# Patient Record
Sex: MALE
[De-identification: names, ages, dates, MRNs, and addresses within clinical notes are randomized per-mention and may not be internally consistent; named-entity substitution may affect disease eponyms.]

---

## 2021-12-09 ENCOUNTER — DASHBOARD ENCOUNTERS (OUTPATIENT)
Age: 56
End: 2021-12-09

## 2021-12-09 ENCOUNTER — OFFICE VISIT (OUTPATIENT)
Dept: URBAN - METROPOLITAN AREA CLINIC 37 | Facility: CLINIC | Age: 56
End: 2021-12-09
Payer: COMMERCIAL

## 2021-12-09 VITALS
DIASTOLIC BLOOD PRESSURE: 84 MMHG | BODY MASS INDEX: 24.92 KG/M2 | HEIGHT: 64 IN | SYSTOLIC BLOOD PRESSURE: 136 MMHG | WEIGHT: 146 LBS | HEART RATE: 73 BPM | OXYGEN SATURATION: 98 %

## 2021-12-09 DIAGNOSIS — E55.9 VITAMIN D DEFICIENCY, UNSPECIFIED: ICD-10-CM

## 2021-12-09 DIAGNOSIS — Z12.11 ENCOUNTER FOR SCREENING FOR MALIGNANT NEOPLASM OF COLON: ICD-10-CM

## 2021-12-09 DIAGNOSIS — I10 ESSENTIAL (PRIMARY) HYPERTENSION: ICD-10-CM

## 2021-12-09 DIAGNOSIS — E78.2 MIXED HYPERLIPIDEMIA: ICD-10-CM

## 2021-12-09 PROBLEM — 34713006: Status: ACTIVE | Noted: 2021-12-09

## 2021-12-09 PROBLEM — 267434003: Status: ACTIVE | Noted: 2021-12-09

## 2021-12-09 PROBLEM — 59621000: Status: ACTIVE | Noted: 2021-12-09

## 2021-12-09 PROCEDURE — 99243 OFF/OP CNSLTJ NEW/EST LOW 30: CPT | Performed by: INTERNAL MEDICINE

## 2021-12-09 PROCEDURE — 99203 OFFICE O/P NEW LOW 30 MIN: CPT | Performed by: INTERNAL MEDICINE

## 2021-12-09 RX ORDER — HYDROCHLOROTHIAZIDE 12.5 MG/1
1 CAPSULE IN THE MORNING TABLET ORAL ONCE A DAY
Status: ACTIVE | COMMUNITY

## 2021-12-09 RX ORDER — ERGOCALCIFEROL 1.25 MG/1
1 CAPSULE CAPSULE ORAL
Status: ACTIVE | COMMUNITY

## 2021-12-09 RX ORDER — SODIUM, POTASSIUM,MAG SULFATES 17.5-3.13G
177 ML SOLUTION, RECONSTITUTED, ORAL ORAL ONCE
Qty: 1 | Refills: 0 | OUTPATIENT
Start: 2021-12-09 | End: 2021-12-11

## 2021-12-09 RX ORDER — ATORVASTATIN CALCIUM 10 MG/1
1 TABLET TABLET, FILM COATED ORAL ONCE A DAY
Status: ACTIVE | COMMUNITY

## 2021-12-09 NOTE — EXAM-MIGRATED EXAMINATIONS
General Examination         GENERAL APPEARANCE: alert, in no acute distress, well developed, well nourished.          HEAD: normocephalic, atraumatic.          EYES: sclera anicteric bilaterally.          EARS: normal.          THROAT: clear.          NECK/THYROID: neck supple, full range of motion, no cervical lymphadenopathy, no thyroid nodules, no thyromegaly, trachea midline.          SKIN: no suspicious lesions, warm and dry, no spider angiomata, palmar erythema or icterus.          HEART: no murmurs, regular rate and rhythm, S1, S2 normal.          LUNGS: clear to auscultation bilaterally, good air movement, no wheezes, rales, rhonchi.          ABDOMEN: bowel sounds present, no masses palpable, no organomegaly , no rebound tenderness, soft, nontender, nondistended.          RECTAL: deferred by patient.          MUSCULOSKELETAL: normal posture, normal gait and station, no decreased range of motion.          EXTREMITIES: no clubbing, cyanosis, or edema.          NEUROLOGIC: cranial nerves 2-12 grossly intact.          PSYCH: cooperative with exam, mood/affect full range.

## 2021-12-09 NOTE — HPI-NEW PATIENT CONSULTATION
Colorectal cancer screening:         Patient is here for initial consultation for first time screening colonoscopy.        Patients denies rectal bleeding, change in bowel habits, constipation, diarrhea, or abdominal pain.        Current bowel movement patterns: one soft BM everyday       Family history of GI malignancy: denies        Denies dysphagia or odynophagia        Currently taking anticoagulants/NSAIDs: none

## 2021-12-17 ENCOUNTER — OFFICE VISIT (OUTPATIENT)
Dept: URBAN - METROPOLITAN AREA SURGERY CENTER 8 | Facility: SURGERY CENTER | Age: 56
End: 2021-12-17
Payer: COMMERCIAL

## 2021-12-17 DIAGNOSIS — K63.89 BACTERIAL OVERGROWTH SYNDROME: ICD-10-CM

## 2021-12-17 DIAGNOSIS — Z12.11 AVERAGE RISK FOR CRC. DUE TO PT'S CO-MORBID STATE WITH END STAGE DEMENTIA, HIGH RISK FOR ANESTHESIA (PER NEUROLOGY); INABILITY TO TAKE A BOWEL PREP....WOULD NOT ADVISE ANY COLORECTAL CANCER SCREENING INCLUDING STOOL TEST FOR FECAL BLOOD.: ICD-10-CM

## 2021-12-17 DIAGNOSIS — D12.5 ADENOMA OF SIGMOID COLON: ICD-10-CM

## 2021-12-17 PROCEDURE — G8907 PT DOC NO EVENTS ON DISCHARG: HCPCS | Performed by: INTERNAL MEDICINE

## 2021-12-17 PROCEDURE — 45380 COLONOSCOPY AND BIOPSY: CPT | Performed by: INTERNAL MEDICINE

## 2021-12-17 RX ORDER — ERGOCALCIFEROL 1.25 MG/1
1 CAPSULE CAPSULE ORAL
Status: ACTIVE | COMMUNITY

## 2021-12-17 RX ORDER — ATORVASTATIN CALCIUM 10 MG/1
1 TABLET TABLET, FILM COATED ORAL ONCE A DAY
Status: ACTIVE | COMMUNITY

## 2021-12-17 RX ORDER — HYDROCHLOROTHIAZIDE 12.5 MG/1
1 CAPSULE IN THE MORNING TABLET ORAL ONCE A DAY
Status: ACTIVE | COMMUNITY

## 2022-01-05 ENCOUNTER — OFFICE VISIT (OUTPATIENT)
Dept: URBAN - METROPOLITAN AREA CLINIC 37 | Facility: CLINIC | Age: 57
End: 2022-01-05

## 2022-01-12 ENCOUNTER — OFFICE VISIT (OUTPATIENT)
Dept: URBAN - METROPOLITAN AREA CLINIC 37 | Facility: CLINIC | Age: 57
End: 2022-01-12

## 2024-08-12 ENCOUNTER — OFFICE VISIT (OUTPATIENT)
Dept: URBAN - METROPOLITAN AREA CLINIC 35 | Facility: CLINIC | Age: 59
End: 2024-08-12

## 2024-08-19 ENCOUNTER — OFFICE VISIT (OUTPATIENT)
Dept: URBAN - METROPOLITAN AREA CLINIC 37 | Facility: CLINIC | Age: 59
End: 2024-08-19
Payer: COMMERCIAL

## 2024-08-19 ENCOUNTER — TELEPHONE ENCOUNTER (OUTPATIENT)
Dept: URBAN - METROPOLITAN AREA CLINIC 35 | Facility: CLINIC | Age: 59
End: 2024-08-19

## 2024-08-19 VITALS
HEIGHT: 64 IN | WEIGHT: 143 LBS | DIASTOLIC BLOOD PRESSURE: 82 MMHG | SYSTOLIC BLOOD PRESSURE: 132 MMHG | HEART RATE: 72 BPM | OXYGEN SATURATION: 98 % | BODY MASS INDEX: 24.41 KG/M2

## 2024-08-19 DIAGNOSIS — R01.1 CARDIAC MURMUR: ICD-10-CM

## 2024-08-19 DIAGNOSIS — D50.8 OTHER IRON DEFICIENCY ANEMIA: ICD-10-CM

## 2024-08-19 DIAGNOSIS — D12.6 ADENOMATOUS POLYP OF COLON, UNSPECIFIED PART OF COLON: ICD-10-CM

## 2024-08-19 PROBLEM — 88610006: Status: ACTIVE | Noted: 2024-08-19

## 2024-08-19 PROBLEM — 87522002: Status: ACTIVE | Noted: 2024-08-19

## 2024-08-19 PROBLEM — 428054006: Status: ACTIVE | Noted: 2024-08-19

## 2024-08-19 PROCEDURE — 99204 OFFICE O/P NEW MOD 45 MIN: CPT | Performed by: HOSPITALIST

## 2024-08-19 RX ORDER — HYDROCHLOROTHIAZIDE 12.5 MG/1
1 CAPSULE IN THE MORNING TABLET ORAL ONCE A DAY
Status: ACTIVE | COMMUNITY

## 2024-08-19 RX ORDER — LISINOPRIL AND HYDROCHLOROTHIAZIDE 12.5; 2 MG/1; MG/1
TABLET ORAL
Qty: 90 TABLET | Status: ACTIVE | COMMUNITY

## 2024-08-19 RX ORDER — PNEUMOCOCCAL 20-VALENT CONJUGATE VACCINE 2.2; 2.2; 2.2; 2.2; 2.2; 2.2; 2.2; 2.2; 2.2; 2.2; 2.2; 2.2; 2.2; 2.2; 2.2; 2.2; 4.4; 2.2; 2.2; 2.2 UG/.5ML; UG/.5ML; UG/.5ML; UG/.5ML; UG/.5ML; UG/.5ML; UG/.5ML; UG/.5ML; UG/.5ML; UG/.5ML; UG/.5ML; UG/.5ML; UG/.5ML; UG/.5ML; UG/.5ML; UG/.5ML; UG/.5ML; UG/.5ML; UG/.5ML; UG/.5ML
AS DIRECTED INJECTION, SUSPENSION INTRAMUSCULAR
Status: ACTIVE | COMMUNITY

## 2024-08-19 RX ORDER — LEVOCETIRIZINE DIHYDROCHLORIDE 5 MG/1
1 TABLET IN THE EVENING TABLET, FILM COATED ORAL ONCE A DAY
Status: ACTIVE | COMMUNITY

## 2024-08-19 RX ORDER — ATORVASTATIN CALCIUM 10 MG/1
1 TABLET TABLET, FILM COATED ORAL ONCE A DAY
Status: ACTIVE | COMMUNITY

## 2024-08-19 RX ORDER — IBUPROFEN 600 MG/1
TABLET, FILM COATED ORAL
Qty: 28 TABLET | Status: ACTIVE | COMMUNITY

## 2024-08-19 RX ORDER — NICOTINE POLACRILEX 4 MG/1
GUM, CHEWING BUCCAL
Qty: 110 EACH | Status: ACTIVE | COMMUNITY

## 2024-08-19 RX ORDER — FERROUS SULFATE TAB EC 325 MG (65 MG FE EQUIVALENT) 325 (65 FE) MG
TABLET DELAYED RESPONSE ORAL
Qty: 60 TABLET | Status: ACTIVE | COMMUNITY

## 2024-08-19 RX ORDER — AMOXICILLIN 500 MG/1
CAPSULE ORAL
Qty: 21 CAPSULE | Status: ACTIVE | COMMUNITY

## 2024-08-19 RX ORDER — BENZONATATE 200 MG/1
1 CAPSULE AS NEEDED CAPSULE ORAL THREE TIMES A DAY
Status: ACTIVE | COMMUNITY

## 2024-08-19 RX ORDER — NIRMATRELVIR AND RITONAVIR 150-100 MG
AS DIRECTED KIT ORAL
Status: ACTIVE | COMMUNITY

## 2024-08-19 RX ORDER — ERGOCALCIFEROL 1.25 MG/1
1 CAPSULE CAPSULE ORAL
Status: ACTIVE | COMMUNITY

## 2024-08-19 NOTE — HPI-TODAY'S VISIT:
59-year-old male with past med history of hypertension, hyperlipidemia, diabetes, ascending aorta dilatation, cardiac murmur presents to the gastroenterology clinic due to new onset iron deficiency anemia.   I reviewed his recent labs which showed severe iron deficiency anemia with saturation of 5% and low iron level with elevated TIBC and ferritin level.  Hemoglobin of 13.1 with normocytic anemia.  Had colonoscopy in December 2021 which showed 3-4 small sessile polyps in the descending colon and sigmoid colon and biopsies consistent with 1 precancerous tubular adenoma in the sigmoid colon and breast hyperplastic polyps.  He also had hemorrhoids.  Bowel prep was suboptimal and was recommended to repeat colonoscopy in 5 years.  Denies any family history of colon cancer or stomach cancer.   Patient denies any upper or lower GI symptoms at this time.  Denies any overt evidence of GI bleed.  Denies any NSAID intake.  Patient has been recently evaluated by his cardiologist due to aortic artery dilatation as well as new onset cardiac murmur as well as CT coronaries showing coronary atherosclerosis.  He has has [facility] Echo scheduled for 09/26/2024 for further evaluation.  Patient never had upper EGD in the past.

## 2024-08-26 LAB
(TTG) AB, IGA: <1
(TTG) AB, IGG: <1
ANTIGLIADIN ABS, IGA: 7.4
GLIADIN (DEAMIDATED) AB (IGG): <1
H PYLORI BREATH TEST: DETECTED
IMMUNOGLOBULIN A: 234

## 2024-08-27 ENCOUNTER — TELEPHONE ENCOUNTER (OUTPATIENT)
Dept: URBAN - METROPOLITAN AREA CLINIC 35 | Facility: CLINIC | Age: 59
End: 2024-08-27

## 2024-08-27 RX ORDER — METRONIDAZOLE 500 MG/1
1 TABLET TABLET ORAL THREE TIMES A DAY
Qty: 42 TABLET | Refills: 0 | OUTPATIENT
Start: 2024-08-27 | End: 2024-09-10

## 2024-08-27 RX ORDER — DOXYCYCLINE HYCLATE 100 MG/1
1 TABLET CAPSULE, GELATIN COATED ORAL TWICE DAILY
Qty: 28 | Refills: 0 | OUTPATIENT
Start: 2024-08-27 | End: 2024-09-10

## 2024-08-27 RX ORDER — BISMUTH SUBSALICYLATE 262 MG/1
1 TABLET TABLET, CHEWABLE ORAL
Qty: 56 TABLET | Refills: 0 | OUTPATIENT
Start: 2024-08-27 | End: 2024-09-10

## 2024-08-27 RX ORDER — OMEPRAZOLE 20 MG/1
1 CAPSULE 30 MINUTES BEFORE MORNING MEAL CAPSULE, DELAYED RELEASE ORAL TWICE DAILY
Qty: 28 | Refills: 0 | OUTPATIENT
Start: 2024-08-27

## 2024-09-11 LAB — H PYLORI BREATH TEST: (no result)

## 2024-09-12 ENCOUNTER — TELEPHONE ENCOUNTER (OUTPATIENT)
Dept: URBAN - METROPOLITAN AREA CLINIC 35 | Facility: CLINIC | Age: 59
End: 2024-09-12

## 2024-09-30 ENCOUNTER — LAB OUTSIDE AN ENCOUNTER (OUTPATIENT)
Dept: URBAN - METROPOLITAN AREA CLINIC 35 | Facility: CLINIC | Age: 59
End: 2024-09-30

## 2024-10-01 ENCOUNTER — LAB OUTSIDE AN ENCOUNTER (OUTPATIENT)
Dept: URBAN - METROPOLITAN AREA CLINIC 35 | Facility: CLINIC | Age: 59
End: 2024-10-01

## 2024-10-02 LAB — H PYLORI BREATH TEST: NOT DETECTED

## 2024-10-03 ENCOUNTER — TELEPHONE ENCOUNTER (OUTPATIENT)
Dept: URBAN - METROPOLITAN AREA CLINIC 35 | Facility: CLINIC | Age: 59
End: 2024-10-03

## 2024-10-14 ENCOUNTER — OFFICE VISIT (OUTPATIENT)
Dept: URBAN - METROPOLITAN AREA CLINIC 35 | Facility: CLINIC | Age: 59
End: 2024-10-14
Payer: COMMERCIAL

## 2024-10-14 VITALS
OXYGEN SATURATION: 97 % | SYSTOLIC BLOOD PRESSURE: 124 MMHG | HEIGHT: 64 IN | BODY MASS INDEX: 23.9 KG/M2 | HEART RATE: 71 BPM | DIASTOLIC BLOOD PRESSURE: 72 MMHG | WEIGHT: 140 LBS

## 2024-10-14 DIAGNOSIS — A04.8 H. PYLORI INFECTION: ICD-10-CM

## 2024-10-14 DIAGNOSIS — R01.1 CARDIAC MURMUR: ICD-10-CM

## 2024-10-14 DIAGNOSIS — D12.5 ADENOMA OF SIGMOID COLON: ICD-10-CM

## 2024-10-14 DIAGNOSIS — D50.9 IRON DEFICIENCY ANEMIA, UNSPECIFIED IRON DEFICIENCY ANEMIA TYPE: ICD-10-CM

## 2024-10-14 PROBLEM — 721730009: Status: ACTIVE | Noted: 2024-10-14

## 2024-10-14 PROCEDURE — 99214 OFFICE O/P EST MOD 30 MIN: CPT | Performed by: HOSPITALIST

## 2024-10-14 RX ORDER — AMOXICILLIN 500 MG/1
CAPSULE ORAL
Qty: 21 CAPSULE | Status: ACTIVE | COMMUNITY

## 2024-10-14 RX ORDER — LISINOPRIL AND HYDROCHLOROTHIAZIDE 12.5; 2 MG/1; MG/1
TABLET ORAL
Qty: 90 TABLET | Status: ACTIVE | COMMUNITY

## 2024-10-14 RX ORDER — IBUPROFEN 600 MG/1
TABLET, FILM COATED ORAL
Qty: 28 TABLET | Status: ACTIVE | COMMUNITY

## 2024-10-14 RX ORDER — PNEUMOCOCCAL 20-VALENT CONJUGATE VACCINE 2.2; 2.2; 2.2; 2.2; 2.2; 2.2; 2.2; 2.2; 2.2; 2.2; 2.2; 2.2; 2.2; 2.2; 2.2; 2.2; 4.4; 2.2; 2.2; 2.2 UG/.5ML; UG/.5ML; UG/.5ML; UG/.5ML; UG/.5ML; UG/.5ML; UG/.5ML; UG/.5ML; UG/.5ML; UG/.5ML; UG/.5ML; UG/.5ML; UG/.5ML; UG/.5ML; UG/.5ML; UG/.5ML; UG/.5ML; UG/.5ML; UG/.5ML; UG/.5ML
AS DIRECTED INJECTION, SUSPENSION INTRAMUSCULAR
Status: ACTIVE | COMMUNITY

## 2024-10-14 RX ORDER — BENZONATATE 200 MG/1
1 CAPSULE AS NEEDED CAPSULE ORAL THREE TIMES A DAY
Status: ACTIVE | COMMUNITY

## 2024-10-14 RX ORDER — OMEPRAZOLE 20 MG/1
1 CAPSULE 30 MINUTES BEFORE MORNING MEAL CAPSULE, DELAYED RELEASE ORAL TWICE DAILY
Qty: 28 | Refills: 0 | Status: ACTIVE | COMMUNITY
Start: 2024-08-27

## 2024-10-14 RX ORDER — LEVOCETIRIZINE DIHYDROCHLORIDE 5 MG/1
1 TABLET IN THE EVENING TABLET, FILM COATED ORAL ONCE A DAY
Status: ACTIVE | COMMUNITY

## 2024-10-14 RX ORDER — ERGOCALCIFEROL 1.25 MG/1
1 CAPSULE CAPSULE ORAL
Status: ACTIVE | COMMUNITY

## 2024-10-14 RX ORDER — ATORVASTATIN CALCIUM 10 MG/1
1 TABLET TABLET, FILM COATED ORAL ONCE A DAY
Status: ACTIVE | COMMUNITY

## 2024-10-14 RX ORDER — FERROUS SULFATE TAB EC 325 MG (65 MG FE EQUIVALENT) 325 (65 FE) MG
TABLET DELAYED RESPONSE ORAL
Qty: 60 TABLET | Status: ACTIVE | COMMUNITY

## 2024-10-14 RX ORDER — NIRMATRELVIR AND RITONAVIR 150-100 MG
AS DIRECTED KIT ORAL
Status: ACTIVE | COMMUNITY

## 2024-10-14 RX ORDER — HYDROCHLOROTHIAZIDE 12.5 MG/1
1 CAPSULE IN THE MORNING TABLET ORAL ONCE A DAY
Status: ACTIVE | COMMUNITY

## 2024-10-14 RX ORDER — NICOTINE POLACRILEX 4 MG/1
GUM, CHEWING BUCCAL
Qty: 110 EACH | Status: ACTIVE | COMMUNITY

## 2024-10-14 NOTE — HPI-TODAY'S VISIT:
59-year-old male with past med history of hypertension, hyperlipidemia, diabetes, ascending aorta dilatation, cardiac murmur presents to the gastroenterology clinic due to new onset iron deficiency anemia.  Interval history -Patient was tested for celiac disease and was negative.  Fit test was negative.  H. pylori test was positive and was treated with quadruple therapy and repeat H. pylori testing was negative consistent with eradication. -Patient still undergoing cardiology workup and echocardiogram pending at this time which is planned on November 15. -Patient denies any GI symptoms for today.  Has been taking iron tablets daily.   Last clinic visit I reviewed his recent labs which showed severe iron deficiency anemia with saturation of 5% and low iron level with elevated TIBC and ferritin level.  Hemoglobin of 13.1 with normocytic anemia.  Had colonoscopy in December 2021 which showed 3-4 small sessile polyps in the descending colon and sigmoid colon and biopsies consistent with 1 precancerous tubular adenoma in the sigmoid colon and breast hyperplastic polyps. He also had hemorrhoids.  Bowel prep was suboptimal and was recommended to repeat colonoscopy in 5 years.  Denies any family history of colon cancer or stomach cancer.

## 2025-01-03 ENCOUNTER — TELEPHONE ENCOUNTER (OUTPATIENT)
Dept: URBAN - METROPOLITAN AREA CLINIC 35 | Facility: CLINIC | Age: 60
End: 2025-01-03

## 2025-01-24 ENCOUNTER — OFFICE VISIT (OUTPATIENT)
Dept: URBAN - METROPOLITAN AREA CLINIC 35 | Facility: CLINIC | Age: 60
End: 2025-01-24
Payer: COMMERCIAL

## 2025-01-24 ENCOUNTER — LAB OUTSIDE AN ENCOUNTER (OUTPATIENT)
Dept: URBAN - METROPOLITAN AREA CLINIC 35 | Facility: CLINIC | Age: 60
End: 2025-01-24

## 2025-01-24 ENCOUNTER — TELEPHONE ENCOUNTER (OUTPATIENT)
Dept: URBAN - METROPOLITAN AREA CLINIC 35 | Facility: CLINIC | Age: 60
End: 2025-01-24

## 2025-01-24 ENCOUNTER — OFFICE VISIT (OUTPATIENT)
Dept: URBAN - METROPOLITAN AREA SURGERY CENTER 8 | Facility: SURGERY CENTER | Age: 60
End: 2025-01-24

## 2025-01-24 VITALS
SYSTOLIC BLOOD PRESSURE: 122 MMHG | HEART RATE: 71 BPM | DIASTOLIC BLOOD PRESSURE: 80 MMHG | HEIGHT: 64 IN | OXYGEN SATURATION: 99 % | BODY MASS INDEX: 24.04 KG/M2 | WEIGHT: 140.8 LBS

## 2025-01-24 DIAGNOSIS — R01.1 CARDIAC MURMUR: ICD-10-CM

## 2025-01-24 DIAGNOSIS — A04.8 H. PYLORI INFECTION: ICD-10-CM

## 2025-01-24 DIAGNOSIS — D12.6 ADENOMATOUS POLYP OF COLON, UNSPECIFIED PART OF COLON: ICD-10-CM

## 2025-01-24 DIAGNOSIS — D50.9 IRON DEFICIENCY ANEMIA, UNSPECIFIED IRON DEFICIENCY ANEMIA TYPE: ICD-10-CM

## 2025-01-24 PROCEDURE — 99214 OFFICE O/P EST MOD 30 MIN: CPT | Performed by: HOSPITALIST

## 2025-01-24 RX ORDER — FERROUS SULFATE TAB EC 325 MG (65 MG FE EQUIVALENT) 325 (65 FE) MG
TABLET DELAYED RESPONSE ORAL
Qty: 60 TABLET | Status: ACTIVE | COMMUNITY

## 2025-01-24 RX ORDER — IBUPROFEN 600 MG/1
TABLET, FILM COATED ORAL
Qty: 28 TABLET | Status: ACTIVE | COMMUNITY

## 2025-01-24 RX ORDER — OMEPRAZOLE 20 MG/1
1 CAPSULE 30 MINUTES BEFORE MORNING MEAL CAPSULE, DELAYED RELEASE ORAL TWICE DAILY
Qty: 28 | Refills: 0 | Status: ACTIVE | COMMUNITY
Start: 2024-08-27

## 2025-01-24 RX ORDER — AMOXICILLIN 500 MG/1
CAPSULE ORAL
Qty: 21 CAPSULE | Status: ACTIVE | COMMUNITY

## 2025-01-24 RX ORDER — PNEUMOCOCCAL 20-VALENT CONJUGATE VACCINE 2.2; 2.2; 2.2; 2.2; 2.2; 2.2; 2.2; 2.2; 2.2; 2.2; 2.2; 2.2; 2.2; 2.2; 2.2; 2.2; 4.4; 2.2; 2.2; 2.2 UG/.5ML; UG/.5ML; UG/.5ML; UG/.5ML; UG/.5ML; UG/.5ML; UG/.5ML; UG/.5ML; UG/.5ML; UG/.5ML; UG/.5ML; UG/.5ML; UG/.5ML; UG/.5ML; UG/.5ML; UG/.5ML; UG/.5ML; UG/.5ML; UG/.5ML; UG/.5ML
AS DIRECTED INJECTION, SUSPENSION INTRAMUSCULAR
Status: ACTIVE | COMMUNITY

## 2025-01-24 RX ORDER — NICOTINE POLACRILEX 4 MG/1
GUM, CHEWING BUCCAL
Qty: 110 EACH | Status: ACTIVE | COMMUNITY

## 2025-01-24 RX ORDER — ERGOCALCIFEROL 1.25 MG/1
1 CAPSULE CAPSULE ORAL
Status: ACTIVE | COMMUNITY

## 2025-01-24 RX ORDER — BENZONATATE 200 MG/1
1 CAPSULE AS NEEDED CAPSULE ORAL THREE TIMES A DAY
Status: ACTIVE | COMMUNITY

## 2025-01-24 RX ORDER — LISINOPRIL AND HYDROCHLOROTHIAZIDE 12.5; 2 MG/1; MG/1
TABLET ORAL
Qty: 90 TABLET | Status: ACTIVE | COMMUNITY

## 2025-01-24 RX ORDER — NIRMATRELVIR AND RITONAVIR 150-100 MG
AS DIRECTED KIT ORAL
Status: ACTIVE | COMMUNITY

## 2025-01-24 RX ORDER — ATORVASTATIN CALCIUM 10 MG/1
1 TABLET TABLET, FILM COATED ORAL ONCE A DAY
Status: ACTIVE | COMMUNITY

## 2025-01-24 RX ORDER — LEVOCETIRIZINE DIHYDROCHLORIDE 5 MG/1
1 TABLET IN THE EVENING TABLET, FILM COATED ORAL ONCE A DAY
Status: ACTIVE | COMMUNITY

## 2025-01-24 RX ORDER — HYDROCHLOROTHIAZIDE 12.5 MG/1
1 CAPSULE IN THE MORNING TABLET ORAL ONCE A DAY
Status: ACTIVE | COMMUNITY

## 2025-01-24 NOTE — HPI-TODAY'S VISIT:
Patient present today for a follow-up for anemia.  Interval history -Patient reports he has completed his workup with cardiologist and he was told his heart looks good and follow-up with a cardiologist in 6 months.  We do not have the records to review and we will obtain it from his cardiology office and also get clearance for his endoscopy procedure. -Patient has been taking ferrous sulfate twice daily for the past 3 to 4 months and we will repeat his iron studies. -Patient denies any overt evidence of GI bleed or any other GI symptoms at this time.  (Last Visit 10.14.2024) 59-year-old male with past med history of hypertension, hyperlipidemia, diabetes, ascending aorta dilatation, cardiac murmur presents to the gastroenterology clinic due to new onset iron deficiency anemia.  Interval history -Patient was tested for celiac disease and was negative.  Fit test was negative.  H. pylori test was positive and was treated with quadruple therapy and repeat H. pylori testing was negative consistent with eradication. -Patient still undergoing cardiology workup and echocardiogram pending at this time which is planned on November 15. -Patient denies any GI symptoms for today.  Has been taking iron tablets daily.   Last clinic visit I reviewed his recent labs which showed severe iron deficiency anemia with saturation of 5% and low iron level with elevated TIBC and ferritin level.  Hemoglobin of 13.1 with normocytic anemia.  Had colonoscopy in December 2021 which showed 3-4 small sessile polyps in the descending colon and sigmoid colon and biopsies consistent with 1 precancerous tubular adenoma in the sigmoid colon and breast hyperplastic polyps. He also had hemorrhoids.  Bowel prep was suboptimal and was recommended to repeat colonoscopy in 5 years.  Denies any family history of colon cancer or stomach cancer.

## 2025-02-03 ENCOUNTER — OFFICE VISIT (OUTPATIENT)
Dept: URBAN - METROPOLITAN AREA CLINIC 35 | Facility: CLINIC | Age: 60
End: 2025-02-03

## 2025-02-25 ENCOUNTER — TELEPHONE ENCOUNTER (OUTPATIENT)
Dept: URBAN - METROPOLITAN AREA CLINIC 35 | Facility: CLINIC | Age: 60
End: 2025-02-25

## 2025-02-26 ENCOUNTER — OFFICE VISIT (OUTPATIENT)
Dept: URBAN - METROPOLITAN AREA MEDICAL CENTER 10 | Facility: MEDICAL CENTER | Age: 60
End: 2025-02-26

## 2025-02-26 RX ORDER — ATORVASTATIN CALCIUM 10 MG/1
1 TABLET TABLET, FILM COATED ORAL ONCE A DAY
Status: ACTIVE | COMMUNITY

## 2025-02-26 RX ORDER — ERGOCALCIFEROL 1.25 MG/1
1 CAPSULE CAPSULE ORAL
Status: ACTIVE | COMMUNITY

## 2025-02-26 RX ORDER — PNEUMOCOCCAL 20-VALENT CONJUGATE VACCINE 2.2; 2.2; 2.2; 2.2; 2.2; 2.2; 2.2; 2.2; 2.2; 2.2; 2.2; 2.2; 2.2; 2.2; 2.2; 2.2; 4.4; 2.2; 2.2; 2.2 UG/.5ML; UG/.5ML; UG/.5ML; UG/.5ML; UG/.5ML; UG/.5ML; UG/.5ML; UG/.5ML; UG/.5ML; UG/.5ML; UG/.5ML; UG/.5ML; UG/.5ML; UG/.5ML; UG/.5ML; UG/.5ML; UG/.5ML; UG/.5ML; UG/.5ML; UG/.5ML
AS DIRECTED INJECTION, SUSPENSION INTRAMUSCULAR
Status: ACTIVE | COMMUNITY

## 2025-02-26 RX ORDER — IBUPROFEN 600 MG/1
TABLET, FILM COATED ORAL
Qty: 28 TABLET | Status: ACTIVE | COMMUNITY

## 2025-02-26 RX ORDER — NIRMATRELVIR AND RITONAVIR 150-100 MG
AS DIRECTED KIT ORAL
Status: ACTIVE | COMMUNITY

## 2025-02-26 RX ORDER — FERROUS SULFATE TAB EC 325 MG (65 MG FE EQUIVALENT) 325 (65 FE) MG
TABLET DELAYED RESPONSE ORAL
Qty: 60 TABLET | Status: ACTIVE | COMMUNITY

## 2025-02-26 RX ORDER — OMEPRAZOLE 20 MG/1
1 CAPSULE 30 MINUTES BEFORE MORNING MEAL CAPSULE, DELAYED RELEASE ORAL TWICE DAILY
Qty: 28 | Refills: 0 | Status: ACTIVE | COMMUNITY
Start: 2024-08-27

## 2025-02-26 RX ORDER — HYDROCHLOROTHIAZIDE 12.5 MG/1
1 CAPSULE IN THE MORNING TABLET ORAL ONCE A DAY
Status: ACTIVE | COMMUNITY

## 2025-02-26 RX ORDER — LISINOPRIL AND HYDROCHLOROTHIAZIDE 12.5; 2 MG/1; MG/1
TABLET ORAL
Qty: 90 TABLET | Status: ACTIVE | COMMUNITY

## 2025-02-26 RX ORDER — NICOTINE POLACRILEX 4 MG/1
GUM, CHEWING BUCCAL
Qty: 110 EACH | Status: ACTIVE | COMMUNITY

## 2025-02-26 RX ORDER — OMEPRAZOLE 40 MG/1
1 CAPSULE 30 MINUTES BEFORE MORNING MEAL CAPSULE, DELAYED RELEASE ORAL ONCE A DAY
Qty: 60 | Refills: 1 | OUTPATIENT
Start: 2025-02-26

## 2025-02-26 RX ORDER — LEVOCETIRIZINE DIHYDROCHLORIDE 5 MG/1
1 TABLET IN THE EVENING TABLET, FILM COATED ORAL ONCE A DAY
Status: ACTIVE | COMMUNITY

## 2025-02-26 RX ORDER — BENZONATATE 200 MG/1
1 CAPSULE AS NEEDED CAPSULE ORAL THREE TIMES A DAY
Status: ACTIVE | COMMUNITY

## 2025-02-26 RX ORDER — AMOXICILLIN 500 MG/1
CAPSULE ORAL
Qty: 21 CAPSULE | Status: ACTIVE | COMMUNITY

## 2025-03-12 ENCOUNTER — TELEPHONE ENCOUNTER (OUTPATIENT)
Dept: URBAN - METROPOLITAN AREA CLINIC 35 | Facility: CLINIC | Age: 60
End: 2025-03-12

## 2025-03-17 ENCOUNTER — OFFICE VISIT (OUTPATIENT)
Dept: URBAN - METROPOLITAN AREA CLINIC 37 | Facility: CLINIC | Age: 60
End: 2025-03-17
Payer: COMMERCIAL

## 2025-03-17 ENCOUNTER — LAB OUTSIDE AN ENCOUNTER (OUTPATIENT)
Dept: URBAN - METROPOLITAN AREA CLINIC 37 | Facility: CLINIC | Age: 60
End: 2025-03-17

## 2025-03-17 VITALS
OXYGEN SATURATION: 98 % | SYSTOLIC BLOOD PRESSURE: 120 MMHG | BODY MASS INDEX: 23.97 KG/M2 | WEIGHT: 140.4 LBS | HEART RATE: 62 BPM | HEIGHT: 64 IN | DIASTOLIC BLOOD PRESSURE: 70 MMHG

## 2025-03-17 DIAGNOSIS — A04.8 H. PYLORI INFECTION: ICD-10-CM

## 2025-03-17 DIAGNOSIS — D50.9 IRON DEFICIENCY ANEMIA, UNSPECIFIED IRON DEFICIENCY ANEMIA TYPE: ICD-10-CM

## 2025-03-17 DIAGNOSIS — D12.5 ADENOMATOUS POLYP OF SIGMOID COLON: ICD-10-CM

## 2025-03-17 DIAGNOSIS — R01.1 CARDIAC MURMUR: ICD-10-CM

## 2025-03-17 PROCEDURE — 99213 OFFICE O/P EST LOW 20 MIN: CPT | Performed by: HOSPITALIST

## 2025-03-17 RX ORDER — IBUPROFEN 600 MG/1
TABLET, FILM COATED ORAL
Qty: 28 TABLET | Status: ACTIVE | COMMUNITY

## 2025-03-17 RX ORDER — NIRMATRELVIR AND RITONAVIR 150-100 MG
AS DIRECTED KIT ORAL
Status: ACTIVE | COMMUNITY

## 2025-03-17 RX ORDER — AMOXICILLIN 500 MG/1
CAPSULE ORAL
Qty: 21 CAPSULE | Status: ACTIVE | COMMUNITY

## 2025-03-17 RX ORDER — NICOTINE POLACRILEX 4 MG/1
GUM, CHEWING BUCCAL
Qty: 110 EACH | Status: ACTIVE | COMMUNITY

## 2025-03-17 RX ORDER — BENZONATATE 200 MG/1
1 CAPSULE AS NEEDED CAPSULE ORAL THREE TIMES A DAY
Status: ACTIVE | COMMUNITY

## 2025-03-17 RX ORDER — OMEPRAZOLE 20 MG/1
1 CAPSULE 30 MINUTES BEFORE MORNING MEAL CAPSULE, DELAYED RELEASE ORAL TWICE DAILY
Qty: 28 | Refills: 0 | Status: ACTIVE | COMMUNITY
Start: 2024-08-27

## 2025-03-17 RX ORDER — ERGOCALCIFEROL 1.25 MG/1
1 CAPSULE CAPSULE ORAL
Status: ACTIVE | COMMUNITY

## 2025-03-17 RX ORDER — OMEPRAZOLE 40 MG/1
1 CAPSULE 30 MINUTES BEFORE MORNING MEAL CAPSULE, DELAYED RELEASE ORAL ONCE A DAY
Qty: 60 | Refills: 1 | Status: ACTIVE | COMMUNITY
Start: 2025-02-26

## 2025-03-17 RX ORDER — LISINOPRIL AND HYDROCHLOROTHIAZIDE 12.5; 2 MG/1; MG/1
TABLET ORAL
Qty: 90 TABLET | Status: ACTIVE | COMMUNITY

## 2025-03-17 RX ORDER — ATORVASTATIN CALCIUM 10 MG/1
1 TABLET TABLET, FILM COATED ORAL ONCE A DAY
Status: ACTIVE | COMMUNITY

## 2025-03-17 RX ORDER — HYDROCHLOROTHIAZIDE 12.5 MG/1
1 CAPSULE IN THE MORNING TABLET ORAL ONCE A DAY
Status: ACTIVE | COMMUNITY

## 2025-03-17 RX ORDER — FERROUS SULFATE TAB EC 325 MG (65 MG FE EQUIVALENT) 325 (65 FE) MG
TABLET DELAYED RESPONSE ORAL
Qty: 60 TABLET | Status: ACTIVE | COMMUNITY

## 2025-03-17 RX ORDER — LEVOCETIRIZINE DIHYDROCHLORIDE 5 MG/1
1 TABLET IN THE EVENING TABLET, FILM COATED ORAL ONCE A DAY
Status: ACTIVE | COMMUNITY

## 2025-03-17 RX ORDER — PNEUMOCOCCAL 20-VALENT CONJUGATE VACCINE 2.2; 2.2; 2.2; 2.2; 2.2; 2.2; 2.2; 2.2; 2.2; 2.2; 2.2; 2.2; 2.2; 2.2; 2.2; 2.2; 4.4; 2.2; 2.2; 2.2 UG/.5ML; UG/.5ML; UG/.5ML; UG/.5ML; UG/.5ML; UG/.5ML; UG/.5ML; UG/.5ML; UG/.5ML; UG/.5ML; UG/.5ML; UG/.5ML; UG/.5ML; UG/.5ML; UG/.5ML; UG/.5ML; UG/.5ML; UG/.5ML; UG/.5ML; UG/.5ML
AS DIRECTED INJECTION, SUSPENSION INTRAMUSCULAR
Status: ACTIVE | COMMUNITY

## 2025-03-17 NOTE — HPI-TODAY'S VISIT:
Patient present today for a follow-up after his procedure and anemia  Interval history -Patient has done well following endoscopy without any complications. -Biopsies for any acute findings. -Patient has been doing okay from GI standpoint and has been following with his cardiologist and family doctor.  Denies any GI symptoms at this time.  Colonoscopy (02.26.2025) Impression: Preparation of the colon was inadequate. A few ulcers in the terminal ileum. Biopsied. Revealed Small intestinal mucosa with no significant histopathologic change.No significant active inflammation or ulceration. One 3 mm polyp in the transverse colon, removed with                        a jumbo cold forceps. Resected and retrieved. Showed Regenerative colonic mucosa with lymphoid aggregate. The entire examined colon is normal. Biopsied. Revealed Colonic mucosa with no significant histopathologic change. The examination was otherwise normal on direct and                        retroflexion views.Inadequate bowel prep noted especially in the right                        side of the colon limiting visualization of the                        chronic mucosa and polyps could be missed. Not                        adequate for screening purposes.  EGD (02.26.2025) Impression: Esophagogastric landmarks identified. Normal esophagus. Erosive gastropathy with no bleeding and no stigmata                        of recent bleeding. Biopsied.Revealed Antral mucosa with mild chronic inactive inflammation and features suggestive of reactive (chemical) gastropathy. Oxyntic mucosa with mild chronic inactive inflammation.No Helicobacter-like organisms identified (Giemsa stain).Negative for intestinal metaplasia (PAS/AB stain). Erythematous duodenopathy. Biopsied. Showed Duodenal mucosa with regenerative features and foveolar metaplasia, suggestive of peptic injury.    (Last Visit 01.24.2025) Patient present today for a follow-up for anemia.  Interval history -Patient reports he has completed his workup with cardiologist and he was told his heart looks good and follow-up with a cardiologist in 6 months.  We do not have the records to review and we will obtain it from his cardiology office and also get clearance for his endoscopy procedure. -Patient has been taking ferrous sulfate twice daily for the past 3 to 4 months and we will repeat his iron studies. -Patient denies any overt evidence of GI bleed or any other GI symptoms at this time.  (Last Visit 10.14.2024) 59-year-old male with past med history of hypertension, hyperlipidemia, diabetes, ascending aorta dilatation, cardiac murmur presents to the gastroenterology clinic due to new onset iron deficiency anemia.    Interval history -Patient was tested for celiac disease and was negative.  Fit test was negative.  H. pylori test was positive and was treated with quadruple therapy and repeat H. pylori testing was negative consistent with eradication. -Patient still undergoing cardiology workup and echocardiogram pending at this time which is planned on November 15. -Patient denies any GI symptoms for today.  Has been taking iron tablets daily.   Last clinic visit I reviewed his recent labs which showed severe iron deficiency anemia with saturation of 5% and low iron level with elevated TIBC and ferritin level.  Hemoglobin of 13.1 with normocytic anemia.  Had colonoscopy in December 2021 which showed 3-4 small sessile polyps in the descending colon and sigmoid colon and biopsies consistent with 1 precancerous tubular adenoma in the sigmoid colon and breast hyperplastic polyps. He also had hemorrhoids.  Bowel prep was suboptimal and was recommended to repeat colonoscopy in 5 years.  Denies any family history of colon cancer or stomach cancer.
